# Patient Record
Sex: FEMALE | Race: WHITE | NOT HISPANIC OR LATINO
[De-identification: names, ages, dates, MRNs, and addresses within clinical notes are randomized per-mention and may not be internally consistent; named-entity substitution may affect disease eponyms.]

---

## 2017-06-14 ENCOUNTER — RESULT REVIEW (OUTPATIENT)
Age: 67
End: 2017-06-14

## 2020-08-21 ENCOUNTER — TRANSCRIPTION ENCOUNTER (OUTPATIENT)
Age: 70
End: 2020-08-21

## 2022-11-13 ENCOUNTER — NON-APPOINTMENT (OUTPATIENT)
Age: 72
End: 2022-11-13

## 2023-09-07 ENCOUNTER — APPOINTMENT (OUTPATIENT)
Dept: ORTHOPEDIC SURGERY | Facility: CLINIC | Age: 73
End: 2023-09-07
Payer: MEDICARE

## 2023-09-07 VITALS — BODY MASS INDEX: 20.83 KG/M2 | HEIGHT: 65 IN | WEIGHT: 125 LBS

## 2023-09-07 PROCEDURE — 99214 OFFICE O/P EST MOD 30 MIN: CPT | Mod: 25

## 2023-09-07 PROCEDURE — 20610 DRAIN/INJ JOINT/BURSA W/O US: CPT | Mod: 50

## 2023-09-07 PROCEDURE — 73564 X-RAY EXAM KNEE 4 OR MORE: CPT | Mod: 50

## 2023-09-07 RX ORDER — ATORVASTATIN CALCIUM 80 MG/1
TABLET, FILM COATED ORAL
Refills: 0 | Status: ACTIVE | COMMUNITY

## 2023-09-07 RX ORDER — ALENDRONATE SODIUM 35 MG/1
TABLET ORAL
Refills: 0 | Status: ACTIVE | COMMUNITY

## 2023-09-07 RX ORDER — METOPROLOL TARTRATE 75 MG/1
TABLET, FILM COATED ORAL
Refills: 0 | Status: ACTIVE | COMMUNITY

## 2023-09-07 NOTE — IMAGING
[de-identified] : Constitutional: well developed and well nourished, able to communicate Cardiovascular: Peripheral vascular exam is grossly normal Neurologic: Alert and oriented, no acute distress. Skin: normal skin with no ulcers, rashes, or lesions Pulmonary: No respiratory distress, breathing comfortably on room air Lymphatics: No obvious lymphadenopathy or lymphedema in areas examined  BILATERAL KNEE EXAM Alignment:  Varus  Effusion: None Atrophy: None                                                  Stable to Varus/valgus stress Posterior Drawer Test: negative Anterior Drawer Test: Negative Knee Extension/Flexion: 0 / 120  Medial/lateral compartments Medial joint line: POS Tenderness Lateral joint line: No Tenderness Abby test: negative  Patellofemoral joint Medial patellar facet: no tenderness Patellar grind: Negative  Tendons: Pes Anserine: No tenderness Gerdys Tubercle/ IT Band: No tenderness Quadriceps Tendon: No Tenderness patellar tendon: no Tenderness Tibial tubercle: not tenderness Calf: no Tenderness  Neurovascular exam Muscle strength: 5/5 Sensation to light touch: intact Distal pulses: 2+  [id number] Xrays of the Left Knee were taken demonstrating varus knee bilateral knees. Medial compartment OA.

## 2023-09-07 NOTE — HISTORY OF PRESENT ILLNESS
[8] : 8 [Dull/Aching] : dull/aching [Intermittent] : intermittent [Household chores] : household chores [Leisure] : leisure [Nothing helps with pain getting better] : Nothing helps with pain getting better [Sitting] : sitting [Standing] : standing [Walking] : walking [Lying in bed] : lying in bed [de-identified] : 9/7/23: Patient is here for bilateral knee pain that began in 2020 for left and in 6/2023 for right. Saw Dr. Ramos in 2020 for left, tried CSI, PT and Euflexxa - felt relief with visco. Right knee is currently worse than left. Swelling. Pain is lateral, medial for right; anterior for left. Worsens with prolonged rest - stiffness. Discomfort with walking. No clicking/buckling. No prior injury to either. No history of sx.  [] : no [FreeTextEntry1] : bilateral knees

## 2023-09-07 NOTE — PROCEDURE
[FreeTextEntry3] : The risks, benefits and alternatives to the injection were discussed with the patient. The decision was made to proceed with the injection to reduce inflammation within the area. Verbal consent was obtained for the procedure. The bilateral was cleaned with alcohol and ethyl chloride was sprayed topically. Ultrasound was used to identify the suprapatellar bursa and visualized the location of the needle. Euflexxa 1 of 3 was injected. The patient tolerated the procedure well and was instructed to ice the affected joint as needed later today. Activities can be performed as tolerated.

## 2023-09-07 NOTE — ASSESSMENT
[FreeTextEntry1] : 73 year F with bilateral knee OA, euflexxa with Dr. Garrett repeat euflexxa 1 of 3 today bilateral knees

## 2023-09-14 ENCOUNTER — APPOINTMENT (OUTPATIENT)
Dept: ORTHOPEDIC SURGERY | Facility: CLINIC | Age: 73
End: 2023-09-14
Payer: MEDICARE

## 2023-09-14 VITALS — WEIGHT: 125 LBS | BODY MASS INDEX: 20.83 KG/M2 | HEIGHT: 65 IN

## 2023-09-14 PROCEDURE — 99213 OFFICE O/P EST LOW 20 MIN: CPT | Mod: 25

## 2023-09-14 PROCEDURE — 20610 DRAIN/INJ JOINT/BURSA W/O US: CPT | Mod: 50

## 2023-09-21 ENCOUNTER — APPOINTMENT (OUTPATIENT)
Dept: ORTHOPEDIC SURGERY | Facility: CLINIC | Age: 73
End: 2023-09-21
Payer: MEDICARE

## 2023-09-21 VITALS — HEIGHT: 65 IN | BODY MASS INDEX: 20.83 KG/M2 | WEIGHT: 125 LBS

## 2023-09-21 PROCEDURE — 99213 OFFICE O/P EST LOW 20 MIN: CPT | Mod: 25

## 2023-09-21 PROCEDURE — 20610 DRAIN/INJ JOINT/BURSA W/O US: CPT | Mod: 50

## 2024-05-23 ENCOUNTER — APPOINTMENT (OUTPATIENT)
Dept: ORTHOPEDIC SURGERY | Facility: CLINIC | Age: 74
End: 2024-05-23
Payer: MEDICARE

## 2024-05-23 VITALS — WEIGHT: 125 LBS | BODY MASS INDEX: 20.83 KG/M2 | HEIGHT: 65 IN

## 2024-05-23 PROCEDURE — 20610 DRAIN/INJ JOINT/BURSA W/O US: CPT | Mod: RT

## 2024-05-23 PROCEDURE — 99212 OFFICE O/P EST SF 10 MIN: CPT | Mod: 25

## 2024-05-23 NOTE — IMAGING
[de-identified] : Constitutional: well developed and well nourished, able to communicate Cardiovascular: Peripheral vascular exam is grossly normal Neurologic: Alert and oriented, no acute distress. Skin: normal skin with no ulcers, rashes, or lesions Pulmonary: No respiratory distress, breathing comfortably on room air Lymphatics: No obvious lymphadenopathy or lymphedema in areas examined  BILATERAL KNEE EXAM Alignment:  Varus  Effusion: None Atrophy: None                                                  Stable to Varus/valgus stress Posterior Drawer Test: negative Anterior Drawer Test: Negative Knee Extension/Flexion: 0 / 120  Medial/lateral compartments Medial joint line: POS Tenderness Lateral joint line: No Tenderness Abby test: negative  Patellofemoral joint Medial patellar facet: no tenderness Patellar grind: Negative  Tendons: Pes Anserine: No tenderness Gerdys Tubercle/ IT Band: No tenderness Quadriceps Tendon: No Tenderness patellar tendon: no Tenderness Tibial tubercle: not tenderness Calf: no Tenderness  Neurovascular exam Muscle strength: 5/5 Sensation to light touch: intact Distal pulses: 2+  [id number] Xrays of the Left Knee were taken demonstrating varus knee bilateral knees. Medial compartment OA.

## 2024-05-23 NOTE — ASSESSMENT
[FreeTextEntry1] : 73 year F with bilateral knee OA, euflexxa with Dr. Garrett repeat euflexxa 1 of 3 today bilateral knees  9/14/23: Euflexxa 2 of 3, follow up in one week for inj #3  9/21/23: Euflexxa 3 of 3, follow up PRN  05/23/2024: Euflexxa 1 of 3 Follow up in one week for inj #2

## 2024-05-23 NOTE — HISTORY OF PRESENT ILLNESS
[Dull/Aching] : dull/aching [Intermittent] : intermittent [Household chores] : household chores [Leisure] : leisure [Nothing helps with pain getting better] : Nothing helps with pain getting better [Sitting] : sitting [Standing] : standing [Walking] : walking [Lying in bed] : lying in bed [3] : 3 [Euflexxa] : Euflexxa [6] : 6 [5] : 5 [de-identified] : 9/7/23: Patient is here for bilateral knee pain that began in 2020 for left and in 6/2023 for right. Saw Dr. Ramos in 2020 for left, tried CSI, PT and Euflexxa - felt relief with visco. Right knee is currently worse than left. Swelling. Pain is lateral, medial for right; anterior for left. Worsens with prolonged rest - stiffness. Discomfort with walking. No clicking/buckling. No prior injury to either. No history of sx.   9/14/23: Patient is here for Euflexxa Injection #2 for bilateral knees. Had a fall yesterday, and landed on left side. No pain post fall, able to ambulate without any issues. Denies falling on knee.   09/21/2023 follow up / Arron knee injection. Reports no changes in symptoms.   05/23/2024 ALBARO STEPHEN is here for follow up. States 7 months of relief with euflexxa.  Euflexxa 1 of 3 given today.  [] : no [FreeTextEntry1] : bilateral knees

## 2024-05-30 ENCOUNTER — APPOINTMENT (OUTPATIENT)
Dept: ORTHOPEDIC SURGERY | Facility: CLINIC | Age: 74
End: 2024-05-30
Payer: MEDICARE

## 2024-05-30 VITALS — BODY MASS INDEX: 20.83 KG/M2 | WEIGHT: 125 LBS | HEIGHT: 65 IN

## 2024-05-30 PROCEDURE — 20610 DRAIN/INJ JOINT/BURSA W/O US: CPT | Mod: 50

## 2024-05-30 PROCEDURE — 99212 OFFICE O/P EST SF 10 MIN: CPT | Mod: 25

## 2024-05-30 NOTE — PROCEDURE
[FreeTextEntry3] : The risks, benefits and alternatives to the injection were discussed with the patient. The decision was made to proceed with the injection to reduce inflammation within the area. Verbal consent was obtained for the procedure. The bilateral  knees were cleaned with alcohol and ethyl chloride was sprayed topically. Euflexxa 2 of 3 was injected. The patient tolerated the procedure well and was instructed to ice the affected joint as needed later today. Activities can be performed as tolerated.

## 2024-05-30 NOTE — HISTORY OF PRESENT ILLNESS
[6] : 6 [5] : 5 [Dull/Aching] : dull/aching [Intermittent] : intermittent [Household chores] : household chores [Leisure] : leisure [Nothing helps with pain getting better] : Nothing helps with pain getting better [Sitting] : sitting [Standing] : standing [Walking] : walking [Lying in bed] : lying in bed [3] : 3 [Euflexxa] : Euflexxa [de-identified] : 9/7/23: Patient is here for bilateral knee pain that began in 2020 for left and in 6/2023 for right. Saw Dr. Ramos in 2020 for left, tried CSI, PT and Euflexxa - felt relief with visco. Right knee is currently worse than left. Swelling. Pain is lateral, medial for right; anterior for left. Worsens with prolonged rest - stiffness. Discomfort with walking. No clicking/buckling. No prior injury to either. No history of sx.   9/14/23: Patient is here for Euflexxa Injection #2 for bilateral knees. Had a fall yesterday, and landed on left side. No pain post fall, able to ambulate without any issues. Denies falling on knee.   09/21/2023 follow up / Arorn knee injection. Reports no changes in symptoms.   05/23/2024 ALBARO STEPHEN is here for follow up. States 7 months of relief with euflexxa.  Euflexxa 1 of 3 given today.   05/30/2024 ALBARO STEPHEN is here for follow up. injection euflexxa 2 of 3 today   [] : no [FreeTextEntry1] : bilateral knees

## 2024-05-30 NOTE — ASSESSMENT
[FreeTextEntry1] : 73 year F with bilateral knee OA, euflexxa with Dr. Garrett repeat euflexxa 1 of 3 today bilateral knees  9/14/23: Euflexxa 2 of 3, follow up in one week for inj #3  9/21/23: Euflexxa 3 of 3, follow up PRN  05/23/2024: Euflexxa 1 of 3 Follow up in one week for inj #2   05/30/2024: Euflexxa 2 of 3 Follow up in one week for inj #3

## 2024-05-30 NOTE — IMAGING
[de-identified] : Constitutional: well developed and well nourished, able to communicate Cardiovascular: Peripheral vascular exam is grossly normal Neurologic: Alert and oriented, no acute distress. Skin: normal skin with no ulcers, rashes, or lesions Pulmonary: No respiratory distress, breathing comfortably on room air Lymphatics: No obvious lymphadenopathy or lymphedema in areas examined  BILATERAL KNEE EXAM Alignment:  Varus  Effusion: None Atrophy: None                                                  Stable to Varus/valgus stress Posterior Drawer Test: negative Anterior Drawer Test: Negative Knee Extension/Flexion: 0 / 120  Medial/lateral compartments Medial joint line: POS Tenderness Lateral joint line: No Tenderness Abby test: negative  Patellofemoral joint Medial patellar facet: no tenderness Patellar grind: Negative  Tendons: Pes Anserine: No tenderness Gerdys Tubercle/ IT Band: No tenderness Quadriceps Tendon: No Tenderness patellar tendon: no Tenderness Tibial tubercle: not tenderness Calf: no Tenderness  Neurovascular exam Muscle strength: 5/5 Sensation to light touch: intact Distal pulses: 2+  [id number] Xrays of the Left Knee were taken demonstrating varus knee bilateral knees. Medial compartment OA.

## 2024-06-06 ENCOUNTER — APPOINTMENT (OUTPATIENT)
Dept: ORTHOPEDIC SURGERY | Facility: CLINIC | Age: 74
End: 2024-06-06
Payer: MEDICARE

## 2024-06-06 VITALS — WEIGHT: 125 LBS | HEIGHT: 65 IN | BODY MASS INDEX: 20.83 KG/M2

## 2024-06-06 DIAGNOSIS — M17.12 UNILATERAL PRIMARY OSTEOARTHRITIS, LEFT KNEE: ICD-10-CM

## 2024-06-06 DIAGNOSIS — M17.11 UNILATERAL PRIMARY OSTEOARTHRITIS, RIGHT KNEE: ICD-10-CM

## 2024-06-06 PROCEDURE — 99214 OFFICE O/P EST MOD 30 MIN: CPT | Mod: 25

## 2024-06-06 PROCEDURE — 20610 DRAIN/INJ JOINT/BURSA W/O US: CPT | Mod: 50

## 2024-06-06 RX ORDER — MELOXICAM 15 MG/1
15 TABLET ORAL
Qty: 30 | Refills: 2 | Status: ACTIVE | COMMUNITY
Start: 2024-06-06 | End: 1900-01-01

## 2024-06-06 NOTE — HISTORY OF PRESENT ILLNESS
[6] : 6 [5] : 5 [Dull/Aching] : dull/aching [Intermittent] : intermittent [Household chores] : household chores [Leisure] : leisure [Nothing helps with pain getting better] : Nothing helps with pain getting better [Sitting] : sitting [Standing] : standing [Walking] : walking [Lying in bed] : lying in bed [3] : 3 [Euflexxa] : Euflexxa [de-identified] : 9/7/23: Patient is here for bilateral knee pain that began in 2020 for left and in 6/2023 for right. Saw Dr. Ramos in 2020 for left, tried CSI, PT and Euflexxa - felt relief with visco. Right knee is currently worse than left. Swelling. Pain is lateral, medial for right; anterior for left. Worsens with prolonged rest - stiffness. Discomfort with walking. No clicking/buckling. No prior injury to either. No history of sx.   9/14/23: Patient is here for Euflexxa Injection #2 for bilateral knees. Had a fall yesterday, and landed on left side. No pain post fall, able to ambulate without any issues. Denies falling on knee.   09/21/2023 follow up / Arron knee injection. Reports no changes in symptoms.   05/23/2024 ALBARO STEPHEN is here for follow up. States 7 months of relief with euflexxa.  Euflexxa 1 of 3 given today.   05/30/2024 ALBARO STEPHEN is here for follow up. injection euflexxa 2 of 3 today 06/06/2024 ALBARO STEPHEN is here for follow up. injection 3 of 3  [] : no [FreeTextEntry1] : bilateral knees

## 2024-06-06 NOTE — ASSESSMENT
[FreeTextEntry1] : 73 year F with bilateral knee OA, euflexxa with Dr. Garrett repeat euflexxa 1 of 3 today bilateral knees  9/14/23: Euflexxa 2 of 3, follow up in one week for inj #3  9/21/23: Euflexxa 3 of 3, follow up PRN  05/23/2024: Euflexxa 1 of 3 Follow up in one week for inj #2   05/30/2024: Euflexxa 2 of 3 Follow up in one week for inj #3  06/06/2024 Euflexxa 3 of 3 today 6 week follow up mobic sent to pharmacy

## 2024-06-06 NOTE — PROCEDURE
[FreeTextEntry3] : The risks, benefits and alternatives to the injection were discussed with the patient. The decision was made to proceed with the injection to reduce inflammation within the area. Verbal consent was obtained for the procedure. The bilateral  knees were cleaned with alcohol and ethyl chloride was sprayed topically. Euflexxa 3 of 3 was injected. The patient tolerated the procedure well and was instructed to ice the affected joint as needed later today. Activities can be performed as tolerated.

## 2024-06-06 NOTE — IMAGING
[de-identified] : Constitutional: well developed and well nourished, able to communicate Cardiovascular: Peripheral vascular exam is grossly normal Neurologic: Alert and oriented, no acute distress. Skin: normal skin with no ulcers, rashes, or lesions Pulmonary: No respiratory distress, breathing comfortably on room air Lymphatics: No obvious lymphadenopathy or lymphedema in areas examined  BILATERAL KNEE EXAM Alignment:  Varus  Effusion: None Atrophy: None                                                  Stable to Varus/valgus stress Posterior Drawer Test: negative Anterior Drawer Test: Negative Knee Extension/Flexion: 0 / 120  Medial/lateral compartments Medial joint line: POS Tenderness Lateral joint line: No Tenderness Abby test: negative  Patellofemoral joint Medial patellar facet: no tenderness Patellar grind: Negative  Tendons: Pes Anserine: No tenderness Gerdys Tubercle/ IT Band: No tenderness Quadriceps Tendon: No Tenderness patellar tendon: no Tenderness Tibial tubercle: not tenderness Calf: no Tenderness  Neurovascular exam Muscle strength: 5/5 Sensation to light touch: intact Distal pulses: 2+  [id number] Xrays of the Left Knee were taken demonstrating varus knee bilateral knees. Medial compartment OA.

## 2024-07-25 ENCOUNTER — APPOINTMENT (OUTPATIENT)
Dept: ORTHOPEDIC SURGERY | Facility: CLINIC | Age: 74
End: 2024-07-25